# Patient Record
Sex: FEMALE | NOT HISPANIC OR LATINO | Employment: FULL TIME | URBAN - METROPOLITAN AREA
[De-identification: names, ages, dates, MRNs, and addresses within clinical notes are randomized per-mention and may not be internally consistent; named-entity substitution may affect disease eponyms.]

---
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
## 2021-05-07 ENCOUNTER — OFFICE VISIT (OUTPATIENT)
Dept: FAMILY MEDICINE | Facility: CLINIC | Age: 21
End: 2021-05-07
Payer: COMMERCIAL

## 2021-05-07 VITALS
BODY MASS INDEX: 41.5 KG/M2 | SYSTOLIC BLOOD PRESSURE: 121 MMHG | DIASTOLIC BLOOD PRESSURE: 84 MMHG | OXYGEN SATURATION: 98 % | TEMPERATURE: 98.5 F | HEART RATE: 92 BPM | RESPIRATION RATE: 16 BRPM | WEIGHT: 217 LBS

## 2021-05-07 DIAGNOSIS — R51.9 NONINTRACTABLE EPISODIC HEADACHE, UNSPECIFIED HEADACHE TYPE: ICD-10-CM

## 2021-05-07 DIAGNOSIS — H60.502 ACUTE OTITIS EXTERNA OF LEFT EAR, UNSPECIFIED TYPE: ICD-10-CM

## 2021-05-07 DIAGNOSIS — H93.8X9 SENSATION OF FULLNESS IN EAR, UNSPECIFIED LATERALITY: ICD-10-CM

## 2021-05-07 DIAGNOSIS — H66.001 ACUTE SUPPURATIVE OTITIS MEDIA OF RIGHT EAR WITHOUT SPONTANEOUS RUPTURE OF TYMPANIC MEMBRANE, RECURRENCE NOT SPECIFIED: Primary | ICD-10-CM

## 2021-05-07 DIAGNOSIS — F41.9 ANXIETY: ICD-10-CM

## 2021-05-07 PROCEDURE — 99204 OFFICE O/P NEW MOD 45 MIN: CPT | Performed by: FAMILY MEDICINE

## 2021-05-07 RX ORDER — CIPROFLOXACIN AND DEXAMETHASONE 3; 1 MG/ML; MG/ML
4 SUSPENSION/ DROPS AURICULAR (OTIC) 2 TIMES DAILY
Qty: 4 ML | Refills: 0 | Status: SHIPPED | OUTPATIENT
Start: 2021-05-07 | End: 2021-05-17

## 2021-05-07 NOTE — PATIENT INSTRUCTIONS
Ear infection  Hard to see if there is a perforation  augmentin twice a day with food 10 days   Ear drops 4 drops 2 a day for 10 days   Call if not better after that       Patient Education     Otitis Media (Middle-Ear Infection) in Adults  Otitis media is another name for a middle-ear infection. It means an infection behind your eardrum. This kind of ear infection can happen after any condition that keeps fluid from draining from the middle ear. These conditions include allergies, a cold, a sore throat, or a respiratory infection.  Middle-ear infections are common in children, but they can also happen in adults. An ear infection in an adult may mean a more serious problem than in a child. So you may need additional tests. If you have an ear infection, you should see your health care provider for treatment.  What are the types of middle-ear infections?  Infections can affect the middle ear in several ways. They are:    Acute otitis media. This middle-ear infection occurs suddenly. It causes swelling and redness. Fluid and mucus become trapped inside the ear. You can have a fever and ear pain.    Otitis media with effusion. Fluid (effusion) and mucus build up in the middle ear after the infection goes away. You may feel like your middle ear is full. This can continue for months and may affect your hearing.    Chronic otitis media with effusion. Fluid (effusion) remains in the middle ear for a long time. Or it builds up again and again, even though there is no infection. This type of middle-ear infection may be hard to treat. It may also affect your hearing.  Who is more likely to get a middle-ear infection?  You are more likely to get an ear infection if you:    Smoke or are around someone who smokes    Have seasonal or year-round allergy symptoms    Have a cold or other upper respiratory infection  What causes a middle-ear infection?  The middle ear connects to the throat by a canal called the eustachian tube. This  tube helps even out the pressure between the outer ear and the inner ear. A cold or allergy can irritate the tube or cause the area around it to swell. This can keep fluid from draining from the middle ear. The fluid builds up behind the eardrum. Bacteria and viruses can grow in this fluid. The bacteria and viruses cause the middle-ear infection.  What are the symptoms of a middle-ear infection?  Common symptoms of a middle-ear infection in adults are:    Pain in 1 or both ears    Drainage from the ear    Muffled hearing    Sore throat   You may also have a fever. Rarely, your balance can be affected.  These symptoms may be the same as for other conditions. It s important to talk with your health care provider if you think you have a middle-ear infection. If you have a high fever, severe pain behind your ear, or paralysis in your face, see your provider as soon as you can.  How is a middle-ear infection diagnosed?  Your health care provider will take a medical history and do a physical exam. He or she will look at the outer ear and eardrum with an otoscope. The otoscope is a lighted tool that lets your provider see inside the ear. A pneumatic otoscope blows a puff of air into the ear to check how well your eardrum moves. If you eardrum doesn t move well, it may mean you have fluid behind it.  Your provider may also do a test called tympanometry. This test tells how well the middle ear is working. It can find any changes in pressure in the middle ear. Your provider may test your hearing with a tuning fork.  How is a middle-ear infection treated?  A middle-ear infection may be treated with:    Antibiotics, taken by mouth or as ear drops    Medication for pain    Decongestants, antihistamines, or nasal steroids  Your health care provider may also have you try autoinsufflation. This helps adjust the air pressure in your ear. For this, you pinch your nose and gently exhale. This forces air back through the eustachian  tube.  The exact treatment for your ear infection will depend on the type of infection you have. In general, if your symptoms don t get better in 48 to 72 hours, contact your health care provider.  Middle-ear infections can cause long-term problems if not treated. They can lead to:    Infection in other parts of the head    Permanent hearing loss    Paralysis of a nerve in your face  If you have a middle-ear infection that doesn t get better, you may need to see an ear, nose, and throat specialist (otolaryngologist). You may need a CT scan or MRI to check for head and neck cancer.  Ear tubes  Sometimes fluid stays in the middle ear even after you take antibiotics and the infection goes away. In this case, your health care provider may suggest that a small tube be placed in your ear. The tube is put at the opening of the eardrum. The tube keeps fluid from building up and relieves pressure in the middle ear. It can also help you hear better. This surgery is called myringotomy. It is not often done in adults.  The tubes usually fall out on their own after 6 months to a year.    0463-5564 [employer]. [address] [place] [address]. All rights reserved. This information is not intended as a substitute for professional medical care. Always follow your healthcare professional's instructions.

## 2021-05-07 NOTE — PROGRESS NOTES
Assessment & Plan     Acute suppurative otitis media of right ear without spontaneous rupture of tympanic membrane, recurrence not specified  Ear infection OM & OE  Hard to see if there is a perforation  Augmentin twice a day with food 10 days   Ear drops 4 drops 2 a day for 10 days  To right ear  Call if not better after that   - amoxicillin-clavulanate (AUGMENTIN) 875-125 MG tablet; Take 1 tablet by mouth 2 times daily for 10 days    Acute otitis externa of left ear, unspecified type  - ciprofloxacin-dexamethasone (CIPRODEX) 0.3-0.1 % otic suspension; Place 4 drops into the right ear 2 times daily for 10 days    Sensation of fullness in ear, unspecified laterality  Due to above    Non intractable episodic headache, unspecified headache type  Related to OM & OE    Anxiety  Reassured with plan in place.     Body mass index equal to or greater than 95th percentile for age in pediatric patient  Not new, stable. New to this provider. Didn't have time to address at this acute visit. suspicion for covid is low currently.     Prescription drug management  15 minutes spent on the date of the encounter doing chart review, history and exam, documentation and further activities per the note       See Patient Instructions    Return in about 1 week (around 5/14/2021), or if symptoms worsen or fail to improve, for Follow up, in person, with any available provider.    Lisa Talley MD  [facility]    Shayy Evangelista is a 20 year old who presents for the following health issues     HPI     Acute Illness  Acute illness concerns: water in right ear sensation, headaches  Onset/Duration: 2- 3 days  Symptoms:  Fever: no  Chills/Sweats: no  Headache (location?): right side  Sinus Pressure: no  Conjunctivitis:  no  Ear Pain: no pain in right ear, just cannot hear out of it  Rhinorrhea: no  Congestion: no  Sore Throat: no  Cough: no  Wheeze: no  Decreased Appetite: no  Nausea: no  Vomiting:  no  Diarrhea: no  Dysuria/Freq.: no  Dysuria or Hematuria: no  Fatigue/Achiness: no  Sick/Strep Exposure: no  Therapies tried and outcome: None   No cutip recently   No wax  No travel  No plane  No sick contacts, lives with parents and family   No environmental allergies  Working  part time work in a kitchen    Hx of being Adopted, hx of Obesity, BMI > 95 % for age, hx of B/l breast reduction in 2017, HLD, Elevated TG, working with a dietician in the past, hx of a reported learning disability, vit d deficiency and anxiety, on vit d, seen by PCP Migel San in 2019 for a 18 yr well child check. Seen 2/21/20 for a cough and advised on home care.    Review of Systems   Constitutional, HEENT, cardiovascular, pulmonary, GI, , musculoskeletal, neuro, skin, endocrine and psych systems are negative, except as otherwise noted.      Objective    /84 (BP Location: Left arm, Patient Position: Sitting, Cuff Size: Adult Large)   Pulse 92   Temp 98.5  F (36.9  C) (Oral)   Resp 16   Wt 98.4 kg (217 lb)   LMP 04/13/2021 (Approximate)   SpO2 98%   BMI 41.50 kg/m    Body mass index is 41.5 kg/m .  Physical Exam   GENERAL: healthy, alert, no distress and obese  EYES: Eyes grossly normal to inspection, PERRL and conjunctivae and sclerae normal  HENT: normal cephalic/atraumatic, right ear: erythematous, bulging membrane, mucopurulent effusion and red and boggy canal, left ear: normal: no effusions, no erythema, normal landmarks, nose and mouth without ulcers or lesions, oropharynx clear, oral mucous membranes moist and sinuses: not tender  NECK: no adenopathy, no asymmetry, masses, or scars and thyroid normal to palpation  RESP: lungs clear to auscultation - no rales, rhonchi or wheezes  CV: regular rate and rhythm, normal S1 S2, no S3 or S4, no murmur, click or rub, no peripheral edema and peripheral pulses strong  ABDOMEN: soft, non tender, no hepatosplenomegaly, no masses and bowel sounds normal  MS: no gross  musculoskeletal defects noted, no edema  SKIN: no suspicious lesions or rashes  NEURO: Normal strength and tone, mentation intact and speech normal  PSYCH: mentation appears normal, affect normal/bright

## 2021-05-15 ENCOUNTER — NURSE TRIAGE (OUTPATIENT)
Dept: NURSING | Facility: CLINIC | Age: 21
End: 2021-05-15

## 2021-05-15 NOTE — TELEPHONE ENCOUNTER
Dad calls with questions regarding patient. Patient was seen a week ago for ear symptoms. She was diagnosed an ear infection. Patient's main symptoms was that her ear was plugged, denied pain in the ear. Patient was prescribed Augmentin and has been taking it. She reports that she is able to hear a little better but has not had a big difference in symptoms. Dad reports that the antibiotics have been causing abdominal pain and diarrhea. Patient has been tolerating it but today is wondering if she can stop the medication.    Dad is advised on home care measures at this time with follow up at the beginning of the week if there has not been much improvement with symptoms. Dad verbalizes understanding and denies further questions at this time.    Shira Canela RN  [facility] Nurse [facility]visors    Reason for Disposition    [1] MILD diarrhea AND [2] taking antibiotics    Additional Information    Negative: Shock suspected (e.g., cold/pale/clammy skin, too weak to stand, low BP, rapid pulse)    Negative: Difficult to awaken or acting confused (e.g., disoriented, slurred speech)    Negative: Sounds like a life-threatening emergency to the triager    Negative: Vomiting also present and worse than the diarrhea    Negative: [1] Blood in stool AND [2] without diarrhea    Negative: Diarrhea in a cancer patient who is currently (or recently) receiving chemotherapy or radiation therapy, or cancer patient who has metastatic or end-stage cancer and is receiving palliative care    Negative: [1] SEVERE abdominal pain (e.g., excruciating) AND [2] present > 1 hour    Negative: [1] SEVERE abdominal pain AND [2] age > 60    Negative: [1] Blood in the stool AND [2] moderate or large amount of blood    Negative: Black or tarry bowel movements  (Exception: chronic-unchanged  black-grey bowel movements AND is taking iron pills or Pepto-bismol)    Negative: [1] Drinking very little AND [2] dehydration suspected (e.g., no urine > 12 hours,  very dry mouth, very lightheaded)    Negative: Patient sounds very sick or weak to the triager    Negative: [1] SEVERE diarrhea (e.g., 7 or more times / day more than normal) AND [2] age > 60 years    Negative: [1] Constant abdominal pain AND [2] present > 2 hours    Negative: [1] Fever > 103 F (39.4 C) AND [2] not able to get the fever down using Fever Care Advice    Negative: [1] SEVERE diarrhea (e.g., 7 or more times / day more than normal) AND [2] present > 24 hours (1 day)    Negative: [1] MODERATE diarrhea (e.g., 4-6 times / day more than normal) AND [2] present > 48 hours (2 days)    Negative: [1] MODERATE diarrhea (e.g., 4-6 times / day more than normal) AND [2] age > 70 years    Negative: Fever > 101 F (38.3 C)    Negative: Fever present > 3 days (72 hours)    Negative: Abdominal pain  (Exception: Pain clears with each passage of diarrhea stool)    Negative: [1] Blood in the stool AND [2] small amount of blood   (Exception: only on toilet paper. Reason: diarrhea can cause rectal irritation with blood on wiping)    Negative: [1] Mucus or pus in stool AND [2] present > 2 days AND [3] diarrhea is more than mild    Negative: [1] Recent antibiotic therapy (i.e., within last 2 months) AND [2] diarrhea present > 3 days since antibiotic was stopped    Negative: [1] Recent hospitalization AND [2] diarrhea present > 3 days    Negative: Weak immune system (e.g., HIV positive, cancer chemo, splenectomy, organ transplant, chronic steroids)    Negative: Tube feedings (e.g., nasogastric, g-tube, j-tube)    Negative: Travel to a foreign country in past month    Negative: [1] MILD diarrhea (e.g., 1-3 or more stools than normal in past 24 hours) without known cause AND [2] present >  7 days    Negative: Diarrhea is a chronic symptom (recurrent or ongoing AND present > 4 weeks)    Negative: SEVERE diarrhea (e.g., 7 or more times / day more than normal)    Negative: MILD-MODERATE diarrhea (e.g., 1-6 times / day more than  normal)    Protocols used: DIARRHEA-A-AH    COVID 19 Nurse Triage Plan/Patient Instructions    Please be aware that novel coronavirus (COVID-19) may be circulating in the community. If you develop symptoms such as fever, cough, or SOB or if you have concerns about the presence of another infection including coronavirus (COVID-19), please contact your health care provider or visit [email or web address].     Disposition/Instructions    Home care recommended. Follow home care protocol based instructions.    Thank you for taking steps to prevent the spread of this virus.  o Limit your contact with others.  o Wear a simple mask to cover your cough.  o Wash your hands well and often.    Resources    [facility]: About COVID-19: [email or web address]    CDC: What to Do If You're Sick: www.cdc.gov/coronavirus/2019-ncov/about/steps-when-sick.html    CDC: Ending Home Isolation: www.cdc.gov/coronavirus/2019-ncov/hcp/disposition-in-home-patients.html     CDC: Caring for Someone: www.cdc.gov/coronavirus/2019-ncov/if-you-are-sick/care-for-someone.html     [facility]: Interim Guidance for Hospital Discharge to Home: www.health.[facility].[place]/diseases/coronavirus/hcp/hospdischarge.pdf    [facility] clinical trials (COVID-19 research studies): [email or web address]-clinical-trials     Below are the COVID-19 hotlines at the [facility] ([facility]). Interpreters are available.   o For health questions: Call 104-538-2311 or 1-755.976.4301 (7 a.m. to 7 p.m.)  o For questions about schools and childcare: Call [phone] or 1-463.249.8163 (7 a.m. to 7 p.m.)

## 2021-05-17 ENCOUNTER — OFFICE VISIT (OUTPATIENT)
Dept: FAMILY MEDICINE | Facility: CLINIC | Age: 21
End: 2021-05-17
Payer: COMMERCIAL

## 2021-05-17 VITALS
HEART RATE: 81 BPM | RESPIRATION RATE: 16 BRPM | BODY MASS INDEX: 40.48 KG/M2 | SYSTOLIC BLOOD PRESSURE: 120 MMHG | DIASTOLIC BLOOD PRESSURE: 78 MMHG | TEMPERATURE: 97.9 F | OXYGEN SATURATION: 97 % | HEIGHT: 62 IN | WEIGHT: 220 LBS

## 2021-05-17 DIAGNOSIS — K52.1 ANTIBIOTIC-ASSOCIATED DIARRHEA: ICD-10-CM

## 2021-05-17 DIAGNOSIS — H66.001 ACUTE SUPPURATIVE OTITIS MEDIA OF RIGHT EAR WITHOUT SPONTANEOUS RUPTURE OF TYMPANIC MEMBRANE, RECURRENCE NOT SPECIFIED: ICD-10-CM

## 2021-05-17 DIAGNOSIS — T36.95XA ANTIBIOTIC-ASSOCIATED DIARRHEA: ICD-10-CM

## 2021-05-17 DIAGNOSIS — H60.391 INFECTIVE OTITIS EXTERNA, RIGHT: ICD-10-CM

## 2021-05-17 LAB
C DIFF TOX B STL QL: NEGATIVE
SPECIMEN SOURCE: NORMAL

## 2021-05-17 PROCEDURE — 99214 OFFICE O/P EST MOD 30 MIN: CPT | Performed by: FAMILY MEDICINE

## 2021-05-17 PROCEDURE — 87493 C DIFF AMPLIFIED PROBE: CPT | Performed by: FAMILY MEDICINE

## 2021-05-17 RX ORDER — CEFDINIR 300 MG/1
300 CAPSULE ORAL 2 TIMES DAILY
Qty: 20 CAPSULE | Refills: 0 | Status: SHIPPED | OUTPATIENT
Start: 2021-05-17 | End: 2021-05-27

## 2021-05-17 RX ORDER — ONDANSETRON 4 MG/1
4 TABLET, ORALLY DISINTEGRATING ORAL EVERY 12 HOURS PRN
Qty: 20 TABLET | Refills: 0 | Status: SHIPPED | OUTPATIENT
Start: 2021-05-17 | End: 2021-06-03

## 2021-05-17 RX ORDER — OFLOXACIN 3 MG/ML
10 SOLUTION AURICULAR (OTIC) DAILY
Qty: 4 ML | Refills: 0 | Status: SHIPPED | OUTPATIENT
Start: 2021-05-17 | End: 2021-05-24

## 2021-05-17 ASSESSMENT — MIFFLIN-ST. JEOR: SCORE: 1726.16

## 2021-05-17 NOTE — PATIENT INSTRUCTIONS
Continue probiotics  For now stop imodium and ok to resume if stool test is negative  zofran (nausea medication) you can take twice daily as needed or 30 minutes before antiobitics  Cefdinir 300 mg twice daily, take with food  Follow up in ten days

## 2021-05-17 NOTE — PROGRESS NOTES
Assessment & Plan     Acute suppurative otitis media of right ear without spontaneous rupture of tympanic membrane, infective otitis externa  - BMI 40. We discussed that likely TM rupture as I am unable to see TM. Infection has not cleared and pt would need another round of abx. She is hesitant due to recent side effects. Pt did agree to another round of abx. Discussed medication side effects. Advised to use Zofran BID PRN or 30 minutes before antibiotics. Recommended continuing probiotics and stopping imodium while c.diff is pending.   - cefdinir (OMNICEF) 300 MG capsule; Take 1 capsule (300 mg) by mouth 2 times daily for 10 days  Dispense: 20 capsule; Refill: 0  - ondansetron (ZOFRAN-ODT) 4 MG ODT tab; Take 1 tablet (4 mg) by mouth every 12 hours as needed for nausea  Dispense: 20 tablet; Refill: 0  - OTOLARYNGOLOGY REFERRAL    Infective otitis externa, right  - ofloxacin (FLOXIN) 0.3 % otic solution; Place 10 drops into the right ear daily for 7 days  Dispense: 4 mL; Refill: 0    BMI 40.0-44.9, adult     Antibiotic-associated diarrhea  - Clostridium difficile Toxin B PCR; Future        Return in about 10 days (around 5/27/2021) for sympotms are not improving.    Shavonne Longoria MD  [facility]    Shayy Evangelista is a 20 year old who presents for the following health issues     HPI         Right ear still having difficulty hearing. No ear drainage, fever or chills.   Nausea has improved.   She is having 3-4 loose stools/day. She has been taking imodium once/day. No hematochezia, melena or abdominal pain.       Review of Systems         Objective    There were no vitals taken for this visit.  There is no height or weight on file to calculate BMI.  Physical Exam   GENERAL: healthy, alert and no distress  HENT: left ear canals and TM's normal, right ear canal with mucopurulent effusion and unable to see TM

## 2021-05-20 NOTE — TELEPHONE ENCOUNTER
FUTURE VISIT INFORMATION      FUTURE VISIT INFORMATION:    Date: 6/3/2021    Time: 2PM    [place] [facility] [place]FORMATION:    Referring provider:  Shavonne Longoria MD    Referring providers clinic:  [facility]     Reason for visit/diagnosis  Referred by Dr Shavonne Longoria, dx: Acute suppurative otitis media of right ear. appt per dad.    RECORDS REQUESTED FROM:       Clinic name Comments Records Status Imaging Status   [facility]  11/7/2017 note from Dr Muñoz   10/3/2017 audiogram  [facility]  5/17/2021 note and referral from Shavonne Longoria MD EPIC

## 2021-06-02 NOTE — PATIENT INSTRUCTIONS
1. You were seen in the ENT Clinic today by Dr. Arreguin.  If you have any questions or concerns after your appointment, please call   - Option 1: ENT Clinic: [phone]   - Option 2: Rachell (Dr. Arreguin's Nurse): [phone]    2.   Plan to return to clinic in 4- 6 weeks    3. Right ear culture- will call with results    4. Tobradex- right ear 2-3 drops twice daily x 2 weeks    5. Alcohol/Vinegar rinse- start after done with Tobradex- x 1 month    Alcohol/Vinegar Ear Irrigations      Items needed:  + Rubber ear or baby bulb syringe (2-3 oz size) or syringe given in clinic  +1 pint of Isopropyl  Alcohol (70%)  +Distilled (white) Vinegar    Instructions:    Remove 2 TBSP alcohol from pint bottle and discard. ADD 2 TBSP of the distilled (white) Vinegar to the pint bottle.  Fill clean cup 1/2 full of the mixture and heat to room temperature. Place large towel under the ear. Irrigate by squeezing syringe to form a steady stream into ear.     Irrigate both ears    Rachell Mathur LPN  [facility] - Otolaryngology    The patient presents with a history of chronic otitis externa.  Her right ear is cleaned today of impaction and she will be treated with Tobradex Otic drops. Therapy will be modified based upon cultures collected today. She will use acetic acid/vinegar/alcohol ear rinses after completion of the Tobradex.

## 2021-06-03 ENCOUNTER — PRE VISIT (OUTPATIENT)
Dept: OTOLARYNGOLOGY | Facility: CLINIC | Age: 21
End: 2021-06-03

## 2021-06-03 ENCOUNTER — TELEPHONE (OUTPATIENT)
Dept: OTOLARYNGOLOGY | Facility: CLINIC | Age: 21
End: 2021-06-03

## 2021-06-03 ENCOUNTER — OFFICE VISIT (OUTPATIENT)
Dept: AUDIOLOGY | Facility: CLINIC | Age: 21
End: 2021-06-03
Attending: FAMILY MEDICINE
Payer: COMMERCIAL

## 2021-06-03 ENCOUNTER — OFFICE VISIT (OUTPATIENT)
Dept: OTOLARYNGOLOGY | Facility: CLINIC | Age: 21
End: 2021-06-03
Payer: COMMERCIAL

## 2021-06-03 VITALS
HEART RATE: 87 BPM | RESPIRATION RATE: 16 BRPM | WEIGHT: 216.05 LBS | HEIGHT: 62 IN | DIASTOLIC BLOOD PRESSURE: 78 MMHG | SYSTOLIC BLOOD PRESSURE: 118 MMHG | OXYGEN SATURATION: 97 % | TEMPERATURE: 97.9 F | BODY MASS INDEX: 39.76 KG/M2

## 2021-06-03 DIAGNOSIS — H90.11 CONDUCTIVE HEARING LOSS OF RIGHT EAR WITH UNRESTRICTED HEARING OF LEFT EAR: Primary | ICD-10-CM

## 2021-06-03 DIAGNOSIS — H92.11 OTORRHEA, RIGHT: ICD-10-CM

## 2021-06-03 DIAGNOSIS — H60.8X3 OTHER NONINFECTIOUS CHRONIC OTITIS EXTERNA OF BOTH EARS: Primary | ICD-10-CM

## 2021-06-03 DIAGNOSIS — H61.21 IMPACTED CERUMEN OF RIGHT EAR: ICD-10-CM

## 2021-06-03 DIAGNOSIS — H60.501 ACUTE OTITIS EXTERNA OF RIGHT EAR, UNSPECIFIED TYPE: ICD-10-CM

## 2021-06-03 PROCEDURE — 87106 FUNGI IDENTIFICATION YEAST: CPT | Mod: 90 | Performed by: PATHOLOGY

## 2021-06-03 PROCEDURE — 92550 TYMPANOMETRY & REFLEX THRESH: CPT | Mod: 52 | Performed by: AUDIOLOGIST

## 2021-06-03 PROCEDURE — 87070 CULTURE OTHR SPECIMN AEROBIC: CPT | Mod: 90 | Performed by: PATHOLOGY

## 2021-06-03 PROCEDURE — 69210 REMOVE IMPACTED EAR WAX UNI: CPT | Performed by: OTOLARYNGOLOGY

## 2021-06-03 PROCEDURE — 99000 SPECIMEN HANDLING OFFICE-LAB: CPT | Performed by: PATHOLOGY

## 2021-06-03 PROCEDURE — 92557 COMPREHENSIVE HEARING TEST: CPT | Performed by: AUDIOLOGIST

## 2021-06-03 PROCEDURE — 99203 OFFICE O/P NEW LOW 30 MIN: CPT | Mod: 25 | Performed by: OTOLARYNGOLOGY

## 2021-06-03 PROCEDURE — 87102 FUNGUS ISOLATION CULTURE: CPT | Mod: 90 | Performed by: PATHOLOGY

## 2021-06-03 RX ORDER — TOBRAMYCIN AND DEXAMETHASONE 3; 1 MG/ML; MG/ML
2-3 SUSPENSION/ DROPS OPHTHALMIC 2 TIMES DAILY
Qty: 10 ML | Refills: 0 | OUTPATIENT
Start: 2021-06-03 | End: 2024-08-08

## 2021-06-03 RX ORDER — TOBRAMYCIN AND DEXAMETHASONE 3; 1 MG/ML; MG/ML
2-3 SUSPENSION/ DROPS OPHTHALMIC 2 TIMES DAILY
Qty: 5 ML | Refills: 0 | Status: SHIPPED | OUTPATIENT
Start: 2021-06-03 | End: 2021-06-17

## 2021-06-03 ASSESSMENT — MIFFLIN-ST. JEOR: SCORE: 1708.25

## 2021-06-03 ASSESSMENT — PAIN SCALES - GENERAL: PAINLEVEL: NO PAIN (0)

## 2021-06-03 NOTE — NURSING NOTE
"Chief Complaint   Patient presents with     Consult     otitis media right ear      Blood pressure 118/78, pulse 87, temperature 97.9  F (36.6  C), resp. rate 16, height 1.575 m (5' 2\"), weight 98 kg (216 lb 0.8 oz), SpO2 97 %.    Tony Macias LPN    "

## 2021-06-03 NOTE — PROGRESS NOTES
The patient presents with a history of chronic otitis externa in the right ear. She has been treated by Dr. Ingrid Muñoz for this condition. She reports that her left ear has not been as problematic as the right ear. She reports discomfort and fullness in the ear with drainage and decreased hearing. She reports a history of ear infections in both ears as a child. The patient denies sinusitis, rhinitis, facial pain, nasal obstruction or purulent nasal discharge.    This patient is seen in consultation at the request of Dr. Shavonne Longoria.    All other systems were reviewed and they are either negative or they are not directly pertinent to this Otolaryngology examination.      Past Medical History:    Past Medical History:   Diagnosis Date     Anxiety 2013?    only with needles     Obesity        Past Surgical History:    Past Surgical History:   Procedure Laterality Date     MAMMOPLASTY REDUCTION BILATERAL Bilateral 6/15/2017    Procedure: MAMMOPLASTY REDUCTION BILATERAL;  Bilateral Breast Reduction;  Surgeon: SLAVA Patel MD;  Location:  OR       Medications:    No current outpatient medications on file.    Allergies:    Patient has no known allergies.    Physical Examination:    The patient is a well developed, well nourished female in no apparent distress.  She is normocephalic, atraumatic with pupils equally round and reactive to light.    Oral Cavity Examination:  Normal mucosa with no masses or lesions  Nasal Examination: Normal mucosa with no masses or lesions  Ear Examination: Ear canal on the left is clear. Ear canal on the right is impacted with mildly infected cerumen impaction and debris. The ear canals are cleaned of cerumen using an alligator forceps and a suction using a binocular microscope for visualization. The tympanic membranes and middle ear spaces normal. Cultures are collected from the right ear.   Neurological Examination: Facial nerve function intact and symmetric  Integumentary  Examination: No lesions on the skin of the head and neck  Neck Examination: No masses or lesions, no lymphadenopathy  Endocrine Examination: Normal thyroid examination    Assessment and Plan:    The patient presents with a history of chronic otitis externa.  Her right ear is cleaned today of impaction and she will be treated with Tobradex Otic drops. Therapy will be modified based upon cultures collected today. She will use acetic acid/vinegar/alcohol ear rinses after completion of the Tobradex.     CC: Dr. Shavonne Longoria

## 2021-06-03 NOTE — TELEPHONE ENCOUNTER
[facility] Call Center    Phone Message    May a detailed message be left on voicemail: yes     Reason for Call: Medication Question or concern regarding medication   Prescription Clarification  Name of Medication: tobramycin-dexamethasone (TOBRADEX) 0.3-0.1 % ophthalmic suspension  Prescribing Provider: Dr Arreguin   Pharmacy:  [id number] - [place], [place] - [address]     What on the order needs clarification? Pharmacist called to alert team that the Rx may have to be re-sent. As is, there will not be enough for the Pt to complete the whole series. Pharm can be reached at [phone]. Thank you    Action Taken: Message routed to:  [facility] ENT    Travel Screening: Not Applicable

## 2021-06-03 NOTE — LETTER
6/3/2021       RE: Tonja Velez  [address]  [place] [address]     Dear Colleague,    Thank you for referring your patient, Tonja Velez, to the [facility] E[facility] CLINIC [place] at [facility]. Please see a copy of my visit note below.    The patient presents with a history of chronic otitis externa in the right ear. She has been treated by Dr. Ingrid Muñoz for this condition. She reports that her left ear has not been as problematic as the right ear. She reports discomfort and fullness in the ear with drainage and decreased hearing. She reports a history of ear infections in both ears as a child. The patient denies sinusitis, rhinitis, facial pain, nasal obstruction or purulent nasal discharge.    This patient is seen in consultation at the request of Dr. Shavonne Longoria.    All other systems were reviewed and they are either negative or they are not directly pertinent to this Otolaryngology examination.      Past Medical History:    Past Medical History:   Diagnosis Date     Anxiety 2013?    only with needles     Obesity        Past Surgical History:    Past Surgical History:   Procedure Laterality Date     MAMMOPLASTY REDUCTION BILATERAL Bilateral 6/15/2017    Procedure: MAMMOPLASTY REDUCTION BILATERAL;  Bilateral Breast Reduction;  Surgeon: SLAVA Patel MD;  Location:  OR       Medications:    No current outpatient medications on file.    Allergies:    Patient has no known allergies.    Physical Examination:    The patient is a well developed, well nourished female in no apparent distress.  She is normocephalic, atraumatic with pupils equally round and reactive to light.    Oral Cavity Examination:  Normal mucosa with no masses or lesions  Nasal Examination: Normal mucosa with no masses or lesions  Ear Examination: Ear canal on the left is clear. Ear canal on the right is impacted with mildly infected cerumen  impaction and debris. The ear canals are cleaned of cerumen using an alligator forceps and a suction using a binocular microscope for visualization. The tympanic membranes and middle ear spaces normal. Cultures are collected from the right ear.   Neurological Examination: Facial nerve function intact and symmetric  Integumentary Examination: No lesions on the skin of the head and neck  Neck Examination: No masses or lesions, no lymphadenopathy  Endocrine Examination: Normal thyroid examination    Assessment and Plan:    The patient presents with a history of chronic otitis externa.  Her right ear is cleaned today of impaction and she will be treated with Tobradex Otic drops. Therapy will be modified based upon cultures collected today. She will use acetic acid/vinegar/alcohol ear rinses after completion of the Tobradex.     CC: Dr. Shavonne Longoria      Again, thank you for allowing me to participate in the care of your patient.      Sincerely,    Chan Arreguin MD

## 2021-06-03 NOTE — PROGRESS NOTES
AUDIOLOGY REPORT    SUMMARY: Audiology visit completed. See audiogram for results.      RECOMMENDATIONS: Follow-up with ENT.    Sarah Soto, [facility]  Licensed Audiologist  [place] #[id number]

## 2021-06-06 LAB
BACTERIA SPEC CULT: ABNORMAL
SPECIMEN SOURCE: ABNORMAL

## 2021-06-25 NOTE — PATIENT INSTRUCTIONS
1. You were seen in the ENT Clinic today by Dr. Arreguin.  If you have any questions or concerns after your appointment, please call   - Option 1: ENT Clinic: [phone]   - Option 2: [place] (Dr. Arreguin's Nurse): [phone]         Candie(Dr. Arreguin's Nurse): [phone]    2.   Plan to return to clinic in 4 months    Alcohol/Vinegar Ear Irrigations      Items needed:  + Rubber ear or baby bulb syringe (2-3 oz size) or syringe given in clinic  +1 pint of Isopropyl  Alcohol (70%)  +Distilled (white) Vinegar    Instructions:    Remove 2 TBSP alcohol from pint bottle and discard. ADD 2 TBSP of the distilled (white) Vinegar to the pint bottle.  Fill clean cup 1/2 full of the mixture and heat to room temperature. Place large towel under the ear. Irrigate by squeezing syringe to form a steady stream into ear.     Irrigate ear(s) as needed      Rachell Mathur LPN  [facility] - Otolaryngology    The patient presents with a history of chronic otitis externa. She was treated with Tobradex Otic drops and she has resolved the infection. She will be treated with acetic acid/vinegar/alcohol ear rinses and she will be seen again in four months to assess her progress.

## 2021-07-01 LAB
FUNGUS SPEC CULT: ABNORMAL
FUNGUS SPEC CULT: ABNORMAL
SPECIMEN SOURCE: ABNORMAL

## 2021-07-02 ENCOUNTER — OFFICE VISIT (OUTPATIENT)
Dept: OTOLARYNGOLOGY | Facility: CLINIC | Age: 21
End: 2021-07-02
Payer: COMMERCIAL

## 2021-07-02 DIAGNOSIS — H60.63 CHRONIC OTITIS EXTERNA OF BOTH EARS, UNSPECIFIED TYPE: Primary | ICD-10-CM

## 2021-07-02 PROCEDURE — 99213 OFFICE O/P EST LOW 20 MIN: CPT | Performed by: OTOLARYNGOLOGY

## 2021-07-02 NOTE — PROGRESS NOTES
The patient presents with a history of chronic otitis externa in the right ear. She has been treated by Dr. Ingrid Muñoz for this condition. She reports that her symptoms have resolved with therapy since her last visit. She denies otorrhea, otalgia or itching in the ears.      She was treated with Tobradex Otic drops.     All other systems were reviewed and they are either negative or they are not directly pertinent to this Otolaryngology examination.      Past Medical History:    Past Medical History:   Diagnosis Date     Anxiety 2013?    only with needles     Obesity        Past Surgical History:    Past Surgical History:   Procedure Laterality Date     MAMMOPLASTY REDUCTION BILATERAL Bilateral 6/15/2017    Procedure: MAMMOPLASTY REDUCTION BILATERAL;  Bilateral Breast Reduction;  Surgeon: SLAVA Patel MD;  Location:  OR       Medications:    No current outpatient medications on file.    Allergies:    Patient has no known allergies.    Physical Examination:    The patient is a well developed, well nourished female in no apparent distress.  She is normocephalic, atraumatic with pupils equally round and reactive to light.    Oral Cavity Examination:  Normal mucosa with no masses or lesions  Nasal Examination: Normal mucosa with no masses or lesions  Ear Examination: Ear canals are clear. Tympanic membranes and middle ear spaces are normal.    Neurological Examination: Facial nerve function intact and symmetric  Integumentary Examination: No lesions on the skin of the head and neck    Assessment and Plan:    The patient presents with a history of chronic otitis externa. She was treated with Tobradex Otic drops and she has resolved the infection. She will be treated with acetic acid/vinegar/alcohol ear rinses and she will be seen again in four months to assess her progress.     CC: Dr. Shavonne Longoria

## 2021-07-02 NOTE — LETTER
7/2/2021       RE: Tonja Velez  [address]  [place] [address]     Dear Colleague,    Thank you for referring your patient, Tonja Velez, to the [facility] EAR NOSE AND THROAT CLINIC [place] at [facility]. Please see a copy of my visit note below.    The patient presents with a history of chronic otitis externa in the right ear. She has been treated by Dr. Ingrid Muñoz for this condition. She reports that her symptoms have resolved with therapy since her last visit. She denies otorrhea, otalgia or itching in the ears.      She was treated with Tobradex Otic drops.     All other systems were reviewed and they are either negative or they are not directly pertinent to this Otolaryngology examination.      Past Medical History:    Past Medical History:   Diagnosis Date     Anxiety 2013?    only with needles     Obesity        Past Surgical History:    Past Surgical History:   Procedure Laterality Date     MAMMOPLASTY REDUCTION BILATERAL Bilateral 6/15/2017    Procedure: MAMMOPLASTY REDUCTION BILATERAL;  Bilateral Breast Reduction;  Surgeon: SLAVA Patel MD;  Location:  OR       Medications:    No current outpatient medications on file.    Allergies:    Patient has no known allergies.    Physical Examination:    The patient is a well developed, well nourished female in no apparent distress.  She is normocephalic, atraumatic with pupils equally round and reactive to light.    Oral Cavity Examination:  Normal mucosa with no masses or lesions  Nasal Examination: Normal mucosa with no masses or lesions  Ear Examination: Ear canals are clear. Tympanic membranes and middle ear spaces are normal.    Neurological Examination: Facial nerve function intact and symmetric  Integumentary Examination: No lesions on the skin of the head and neck    Assessment and Plan:    The patient presents with a history of chronic otitis externa. She was  treated with Tobradex Otic drops and she has resolved the infection. She will be treated with acetic acid/vinegar/alcohol ear rinses and she will be seen again in four months to assess her progress.     CC: Dr. Shavonne Longoria      Again, thank you for allowing me to participate in the care of your patient.      Sincerely,    Chan Arreguin MD

## 2021-09-19 ENCOUNTER — HEALTH MAINTENANCE LETTER (OUTPATIENT)
Age: 21
End: 2021-09-19

## 2021-12-23 ENCOUNTER — OFFICE VISIT (OUTPATIENT)
Dept: OTOLARYNGOLOGY | Facility: CLINIC | Age: 21
End: 2021-12-23
Payer: COMMERCIAL

## 2021-12-23 VITALS
HEIGHT: 62 IN | BODY MASS INDEX: 40.48 KG/M2 | TEMPERATURE: 97.4 F | SYSTOLIC BLOOD PRESSURE: 111 MMHG | HEART RATE: 86 BPM | OXYGEN SATURATION: 97 % | DIASTOLIC BLOOD PRESSURE: 78 MMHG | WEIGHT: 220 LBS

## 2021-12-23 DIAGNOSIS — H60.63 CHRONIC OTITIS EXTERNA OF BOTH EARS, UNSPECIFIED TYPE: Primary | ICD-10-CM

## 2021-12-23 PROCEDURE — 99213 OFFICE O/P EST LOW 20 MIN: CPT | Performed by: OTOLARYNGOLOGY

## 2021-12-23 ASSESSMENT — MIFFLIN-ST. JEOR: SCORE: 1721.16

## 2021-12-23 ASSESSMENT — PAIN SCALES - GENERAL: PAINLEVEL: NO PAIN (0)

## 2021-12-23 NOTE — PATIENT INSTRUCTIONS
1. You were seen in the ENT Clinic today by Dr. Arreguin.  If you have any questions or concerns after your appointment, please call   - Option 1: ENT Clinic: [phone]   - Option 2: Rachell (Dr. Arreguin's Nurse): [phone]         Candie(Dr. Arreguin's Nurse): [phone]    2.   Plan to return to clinic in 4 months    Rachell Mathur LPN  [facility] - Otolaryngology    The patient presents with a history of chronic otitis externa. She was treated with Tobradex Otic drops and she has resolved the infection. She has done very well for the past four months. She will follow up in six months or sooner if she has any difficulty.

## 2021-12-23 NOTE — PROGRESS NOTES
The patient presents with a history of chronic otitis externa in the right ear. She has been treated by Dr. Ingrid Muñoz for this condition. She reports that her symptoms have resolved with therapy since her last visit. She denies otorrhea, otalgia or itching in the ears.  She reports having no difficulty since her last visit.       All other systems were reviewed and they are either negative or they are not directly pertinent to this Otolaryngology examination.      Past Medical History:    Past Medical History:   Diagnosis Date     Anxiety 2013?    only with needles     Obesity        Past Surgical History:    Past Surgical History:   Procedure Laterality Date     MAMMOPLASTY REDUCTION BILATERAL Bilateral 6/15/2017    Procedure: MAMMOPLASTY REDUCTION BILATERAL;  Bilateral Breast Reduction;  Surgeon: SLAVA Patel MD;  Location:  OR       Medications:    No current outpatient medications on file.    Allergies:    Patient has no known allergies.    Physical Examination:    The patient is a well developed, well nourished female in no apparent distress.  She is normocephalic, atraumatic with pupils equally round and reactive to light.    Ear Examination: Ear canals are clear. Tympanic membranes and middle ear spaces are normal.    Neurological Examination: Facial nerve function intact and symmetric  Integumentary Examination: No lesions on the skin of the head and neck    Assessment and Plan:    The patient presents with a history of chronic otitis externa. She was treated with Tobradex Otic drops and she has resolved the infection. She has done very well for the past four months. She will follow up in six months or sooner if she has any difficulty.      CC: Dr. Shavonne Longoria

## 2021-12-23 NOTE — LETTER
12/23/2021       RE: Tonja Velez  [address]  [place] [address]     Dear Colleague,    Thank you for referring your patient, Tonja Velez, to the [facility] EAR NOSE AND THROAT CLINIC [place] at [facility]. Please see a copy of my visit note below.    The patient presents with a history of chronic otitis externa in the right ear. She has been treated by Dr. Ingrid Muñoz for this condition. She reports that her symptoms have resolved with therapy since her last visit. She denies otorrhea, otalgia or itching in the ears.  She reports having no difficulty since her last visit.       All other systems were reviewed and they are either negative or they are not directly pertinent to this Otolaryngology examination.      Past Medical History:    Past Medical History:   Diagnosis Date     Anxiety 2013?    only with needles     Obesity        Past Surgical History:    Past Surgical History:   Procedure Laterality Date     MAMMOPLASTY REDUCTION BILATERAL Bilateral 6/15/2017    Procedure: MAMMOPLASTY REDUCTION BILATERAL;  Bilateral Breast Reduction;  Surgeon: SLAVA Patel MD;  Location:  OR       Medications:    No current outpatient medications on file.    Allergies:    Patient has no known allergies.    Physical Examination:    The patient is a well developed, well nourished female in no apparent distress.  She is normocephalic, atraumatic with pupils equally round and reactive to light.    Ear Examination: Ear canals are clear. Tympanic membranes and middle ear spaces are normal.    Neurological Examination: Facial nerve function intact and symmetric  Integumentary Examination: No lesions on the skin of the head and neck    Assessment and Plan:    The patient presents with a history of chronic otitis externa. She was treated with Tobradex Otic drops and she has resolved the infection. She has done very well for the past four months.  She will follow up in six months or sooner if she has any difficulty.      CC: Dr. Shavonne Longoria      Again, thank you for allowing me to participate in the care of your patient.      Sincerely,    Chan Arreguin MD

## 2021-12-23 NOTE — NURSING NOTE
"Chief Complaint   Patient presents with     RECHECK     4 month follow up      Blood pressure 111/78, pulse 86, temperature 97.4  F (36.3  C), height 1.575 m (5' 2\"), weight 99.8 kg (220 lb), SpO2 97 %.    .  Tony Macias LPN    "

## 2022-01-09 ENCOUNTER — HEALTH MAINTENANCE LETTER (OUTPATIENT)
Age: 22
End: 2022-01-09

## 2022-03-19 ENCOUNTER — ANCILLARY PROCEDURE (OUTPATIENT)
Dept: GENERAL RADIOLOGY | Facility: CLINIC | Age: 22
End: 2022-03-19
Attending: FAMILY MEDICINE
Payer: OTHER MISCELLANEOUS

## 2022-03-19 ENCOUNTER — OFFICE VISIT (OUTPATIENT)
Dept: URGENT CARE | Facility: URGENT CARE | Age: 22
End: 2022-03-19
Payer: OTHER MISCELLANEOUS

## 2022-03-19 VITALS
TEMPERATURE: 97.3 F | HEIGHT: 62 IN | OXYGEN SATURATION: 98 % | SYSTOLIC BLOOD PRESSURE: 122 MMHG | HEART RATE: 69 BPM | DIASTOLIC BLOOD PRESSURE: 76 MMHG | WEIGHT: 226 LBS | BODY MASS INDEX: 41.59 KG/M2

## 2022-03-19 DIAGNOSIS — S69.92XA HAND INJURY, LEFT, INITIAL ENCOUNTER: ICD-10-CM

## 2022-03-19 DIAGNOSIS — S69.92XA HAND INJURY, LEFT, INITIAL ENCOUNTER: Primary | ICD-10-CM

## 2022-03-19 PROCEDURE — 99214 OFFICE O/P EST MOD 30 MIN: CPT | Performed by: FAMILY MEDICINE

## 2022-03-19 PROCEDURE — 73130 X-RAY EXAM OF HAND: CPT | Mod: LT | Performed by: RADIOLOGY

## 2022-03-19 PROCEDURE — 73110 X-RAY EXAM OF WRIST: CPT | Mod: LT | Performed by: RADIOLOGY

## 2022-03-19 NOTE — PROGRESS NOTES
"Assessment & Plan     Hand injury, left, initial encounter  - XR Wrist Left G/E 3 Views  - XR Hand Left G/E 3 Views  - Wrist/Arm/Hand Supplies Order for DME - ONLY FOR DME       No fracture per my read of hand/wrist. Requesting time off from work for 1-2 days to allow pain to subside.  OTC analgesics as needed. Use ICE if swelling is present  See AVS summary for additional recommendations reviewed with patient during this visit.         Kanu Covarrubias MD   [facility]    Subjective     Larry is a 21 year old female who presents to clinic today for the following health issues:  Chief Complaint   Patient presents with     Urgent Care     Pt in clinic to have eval for left hand injury.     Hand Injury     HPI    This occurred around 11 AM ( 3 hours ago)  Caught her hand in the  at work    Her left wrist is bothering her  Denies numbness of the hand.   She is right hand dominant.   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     Only relevant medical information included as this is a possible workman's comp visit    No current outpatient medications on file.     No current facility-administered medications for this visit.         Objective    /76   Pulse 69   Temp 97.3  F (36.3  C) (Temporal)   Ht 1.575 m (5' 2\")   Wt 102.5 kg (226 lb)   LMP 02/28/2022   SpO2 98%   BMI 41.34 kg/m    Physical Exam     Left hand/wrist: pain of the radial wrist and base of the hand. No open wounds. Intact  strength    Results for orders placed or performed in visit on 03/19/22   XR Wrist Left G/E 3 Views     Status: None    Narrative    EXAM: XR HAND LT G/E 3 VW, XR WRIST LEFT G/E 3 VIEWS  LOCATION: [facility]  DATE/TIME: 3/19/2022 2:23 PM    INDICATION: Left hand and wrist pain after traumatic injury.  COMPARISON: None.      Impression    IMPRESSION: Mild soft tissue swelling over the dorsum of " the hand. No radiopaque foreign body. Normal joint alignment throughout the hand and wrist. No fracture.   Results for orders placed or performed in visit on 03/19/22   XR Hand Left G/E 3 Views     Status: None    Narrative    EXAM: XR HAND LT G/E 3 VW, XR WRIST LEFT G/E 3 VIEWS  LOCATION: [facility]  DATE/TIME: 3/19/2022 2:23 PM    INDICATION: Left hand and wrist pain after traumatic injury.  COMPARISON: None.      Impression    IMPRESSION: Mild soft tissue swelling over the dorsum of the hand. No radiopaque foreign body. Normal joint alignment throughout the hand and wrist. No fracture.           The use of [employer] dictation services may have been used to construct the content in this note; any grammatical or spelling errors are non-intentional. Please contact the author of this note directly if you are in need of any clarification.

## 2022-03-19 NOTE — PATIENT INSTRUCTIONS
Tylenol 325-650mg or ibuprofen 400mg every 4-6 hours as needed for pain.      If is a bit of swelling youcan ice the area for 10 minutes at a time every 30 to 60 minutes do this for the first 2 days.      Use the wrist brace for the first few days to help support and protect the area      If no improvement in pain the next 3 days please call your doctors office or make an appointment to see sports medicine  Call [phone] to schedule an appointment with the Sports medicine specialists

## 2022-04-22 ENCOUNTER — OFFICE VISIT (OUTPATIENT)
Dept: OTOLARYNGOLOGY | Facility: CLINIC | Age: 22
End: 2022-04-22
Payer: COMMERCIAL

## 2022-04-22 VITALS
HEART RATE: 91 BPM | HEIGHT: 62 IN | TEMPERATURE: 97.3 F | WEIGHT: 216 LBS | DIASTOLIC BLOOD PRESSURE: 74 MMHG | OXYGEN SATURATION: 98 % | BODY MASS INDEX: 39.75 KG/M2 | SYSTOLIC BLOOD PRESSURE: 116 MMHG

## 2022-04-22 DIAGNOSIS — H60.63 CHRONIC OTITIS EXTERNA OF BOTH EARS, UNSPECIFIED TYPE: Primary | ICD-10-CM

## 2022-04-22 PROCEDURE — 99212 OFFICE O/P EST SF 10 MIN: CPT | Performed by: OTOLARYNGOLOGY

## 2022-04-22 ASSESSMENT — PAIN SCALES - GENERAL: PAINLEVEL: NO PAIN (0)

## 2022-04-22 NOTE — NURSING NOTE
"Chief Complaint   Patient presents with     RECHECK     Follow up      Blood pressure 116/74, pulse 91, temperature 97.3  F (36.3  C), height 1.575 m (5' 2\"), weight 98 kg (216 lb), last menstrual period 02/28/2022, SpO2 98 %.    Tony Macias LPN    "

## 2022-04-22 NOTE — PROGRESS NOTES
The patient presents with a history of chronic otitis externa in the right ear. She has been treated by Dr. Ingrid Muñoz for this condition. She reports that her symptoms have resolved and she is not having any difficulty with her ears. She denies infection or drainage.       All other systems were reviewed and they are either negative or they are not directly pertinent to this Otolaryngology examination.      Past Medical History:    Past Medical History:   Diagnosis Date     Anxiety 2013?    only with needles     Obesity        Past Surgical History:    Past Surgical History:   Procedure Laterality Date     MAMMOPLASTY REDUCTION BILATERAL Bilateral 6/15/2017    Procedure: MAMMOPLASTY REDUCTION BILATERAL;  Bilateral Breast Reduction;  Surgeon: SLAVA Patel MD;  Location:  OR       Medications:    No current outpatient medications on file.    Allergies:    Patient has no known allergies.    Physical Examination:    The patient is a well developed, well nourished female in no apparent distress.  She is normocephalic, atraumatic with pupils equally round and reactive to light.    Ear Examination: Ear canals are clear. Tympanic membranes and middle ear spaces are normal.    Neurological Examination: Facial nerve function intact and symmetric  Integumentary Examination: No lesions on the skin of the head and neck    Assessment and Plan:    The patient presents with a history of chronic otitis externa. She was treated with Tobradex Otic drops and she has resolved the infection. She has done very well since her last.. She will follow up in six months or sooner if she has any difficulty.      CC: Dr. Shavonne Longoria

## 2022-04-22 NOTE — LETTER
4/22/2022         RE: Tonja Velez  [address]  [place] [address]         Dear Colleague,    Thank you for referring your patient, Tonja Velez, to the [facility] EAR NOSE AND THROAT CLINIC [place] at [facility]. Please see a copy of my visit note below.    The patient presents with a history of chronic otitis externa in the right ear. She has been treated by Dr. Ingrid Muñoz for this condition. She reports that her symptoms have resolved and she is not having any difficulty with her ears. She denies infection or drainage.       All other systems were reviewed and they are either negative or they are not directly pertinent to this Otolaryngology examination.      Past Medical History:    Past Medical History:   Diagnosis Date     Anxiety 2013?    only with needles     Obesity        Past Surgical History:    Past Surgical History:   Procedure Laterality Date     MAMMOPLASTY REDUCTION BILATERAL Bilateral 6/15/2017    Procedure: MAMMOPLASTY REDUCTION BILATERAL;  Bilateral Breast Reduction;  Surgeon: SLAVA Patel MD;  Location:  OR       Medications:    No current outpatient medications on file.    Allergies:    Patient has no known allergies.    Physical Examination:    The patient is a well developed, well nourished female in no apparent distress.  She is normocephalic, atraumatic with pupils equally round and reactive to light.    Ear Examination: Ear canals are clear. Tympanic membranes and middle ear spaces are normal.    Neurological Examination: Facial nerve function intact and symmetric  Integumentary Examination: No lesions on the skin of the head and neck    Assessment and Plan:    The patient presents with a history of chronic otitis externa. She was treated with Tobradex Otic drops and she has resolved the infection. She has done very well since her last.. She will follow up in six months or sooner if she has any  difficulty.            Again, thank you for allowing me to participate in the care of your patient.      Sincerely,    Chan Arreguin MD

## 2022-04-22 NOTE — PATIENT INSTRUCTIONS
1. You were seen in the ENT Clinic today by Dr. Arreguin.  If you have any questions or concerns after your appointment, please call   - Option 1: ENT Clinic: [phone]   - Option 2: Rachell (Dr. Arreguin's Nurse): [phone]         Candie(Dr. Arreguin's Nurse): [phone]    2.   Plan to return to clinic in 6 months    Rachell Mathur LPN  [facility] - Otolaryngology     The patient presents with a history of chronic otitis externa. She was treated with Tobradex Otic drops and she has resolved the infection. She has done very well since her last.. She will follow up in six months or sooner if she has any difficulty.

## 2022-11-20 ENCOUNTER — HEALTH MAINTENANCE LETTER (OUTPATIENT)
Age: 22
End: 2022-11-20

## 2022-11-25 ENCOUNTER — TELEPHONE (OUTPATIENT)
Dept: OTOLARYNGOLOGY | Facility: CLINIC | Age: 22
End: 2022-11-25

## 2022-11-25 NOTE — TELEPHONE ENCOUNTER
Spoke with patient's Father, regarding rescheduling due to provider is out of clinic. Rescheduled patient accordingly and sent new reminder letter to confirmed address. -Per Patient's Father

## 2023-01-20 ENCOUNTER — OFFICE VISIT (OUTPATIENT)
Dept: OTOLARYNGOLOGY | Facility: CLINIC | Age: 23
End: 2023-01-20
Payer: COMMERCIAL

## 2023-01-20 VITALS
BODY MASS INDEX: 42.6 KG/M2 | HEART RATE: 77 BPM | DIASTOLIC BLOOD PRESSURE: 86 MMHG | OXYGEN SATURATION: 99 % | SYSTOLIC BLOOD PRESSURE: 123 MMHG | WEIGHT: 217 LBS | TEMPERATURE: 98.6 F | HEIGHT: 60 IN

## 2023-01-20 DIAGNOSIS — H60.393 INFECTIVE OTITIS EXTERNA, BILATERAL: Primary | ICD-10-CM

## 2023-01-20 PROBLEM — E66.01 MORBID OBESITY (H): Status: ACTIVE | Noted: 2023-01-20

## 2023-01-20 PROCEDURE — 99213 OFFICE O/P EST LOW 20 MIN: CPT | Performed by: OTOLARYNGOLOGY

## 2023-01-20 ASSESSMENT — PAIN SCALES - GENERAL: PAINLEVEL: NO PAIN (0)

## 2023-01-20 NOTE — PROGRESS NOTES
The patient presents with a history of chronic otitis externa in the right ear. She has been treated by Dr. Ingrid Muñoz for this condition. She reports that her symptoms have resolved and she is not having any difficulty with her ears. She denies infection or drainage. She denies respiratory or throat infections.       All other systems were reviewed and they are either negative or they are not directly pertinent to this Otolaryngology examination.      Past Medical History:    Past Medical History:   Diagnosis Date     Anxiety 2013?    only with needles     Obesity        Past Surgical History:    Past Surgical History:   Procedure Laterality Date     MAMMOPLASTY REDUCTION BILATERAL Bilateral 6/15/2017    Procedure: MAMMOPLASTY REDUCTION BILATERAL;  Bilateral Breast Reduction;  Surgeon: SLAVA Patel MD;  Location:  OR       Medications:    No current outpatient medications on file.    Allergies:    Patient has no known allergies.    Physical Examination:    The patient is a well developed, well nourished female in no apparent distress.  She is normocephalic, atraumatic with pupils equally round and reactive to light.    Ear Examination: Ear canals are clear. Tympanic membranes and middle ear spaces are normal.    Neurological Examination: Facial nerve function intact and symmetric  Integumentary Examination: No lesions on the skin of the head and neck    Assessment and Plan:    The patient presents with a history of chronic otitis externa. She was treated with Tobradex Otic drops in the past and she has resolved the infection. She has done very well since her last visit. She will follow up in twelve months or sooner if she has any difficulty.      CC: Dr. Shavonne Longoria

## 2023-01-20 NOTE — LETTER
1/20/2023       RE: Tonja Velez  [address]  [place] [address]     Dear Colleague,    Thank you for referring your patient, Tonja Velez, to the [facility] EAR NOSE AND THROAT CLINIC [place] at [facility]. Please see a copy of my visit note below.    The patient presents with a history of chronic otitis externa in the right ear. She has been treated by Dr. Ingrid Muñoz for this condition. She reports that her symptoms have resolved and she is not having any difficulty with her ears. She denies infection or drainage. She denies respiratory or throat infections.       All other systems were reviewed and they are either negative or they are not directly pertinent to this Otolaryngology examination.      Past Medical History:    Past Medical History:   Diagnosis Date     Anxiety 2013?    only with needles     Obesity        Past Surgical History:    Past Surgical History:   Procedure Laterality Date     MAMMOPLASTY REDUCTION BILATERAL Bilateral 6/15/2017    Procedure: MAMMOPLASTY REDUCTION BILATERAL;  Bilateral Breast Reduction;  Surgeon: SLAVA Patel MD;  Location:  OR       Medications:    No current outpatient medications on file.    Allergies:    Patient has no known allergies.    Physical Examination:    The patient is a well developed, well nourished female in no apparent distress.  She is normocephalic, atraumatic with pupils equally round and reactive to light.    Ear Examination: Ear canals are clear. Tympanic membranes and middle ear spaces are normal.    Neurological Examination: Facial nerve function intact and symmetric  Integumentary Examination: No lesions on the skin of the head and neck    Assessment and Plan:    The patient presents with a history of chronic otitis externa. She was treated with Tobradex Otic drops in the past and she has resolved the infection. She has done very well since her last visit. She  will follow up in twelve months or sooner if she has any difficulty.      CC: Dr. Shavonne Longoria      Again, thank you for allowing me to participate in the care of your patient.      Sincerely,    Chan Arreguin MD

## 2023-01-20 NOTE — PATIENT INSTRUCTIONS
1. You were seen in the ENT Clinic today by Dr. Arreguin.  If you have any questions or concerns after your appointment, please call   - Option 1: ENT Clinic: [phone]   - Option 2: Rachell (Dr. Arreguin's Nurse): [phone]       Tracey (Dr. Arreguin's Nurse): [phone]    2.   Plan to return to clinic in 1 year    How to Contact Us:  Send a [employer] message to your provider. Our team will respond to you via [employer]. Occasionally, we will need to call you to get further information.  For urgent matters (Monday-Friday), call the ENT Clinic: [phone] and speak with a call center team member - they will route your call appropriately.   If you'd like to speak directly with a nurse, please find our contact information below. We do our best to check voicemail frequently throughout the day, and will work to call you back within 1-2 days. For urgent matters, please use the general clinic phone numbers listed above.    The patient presents with a history of chronic otitis externa. She was treated with Tobradex Otic drops in the past and she has resolved the infection. She has done very well since her last visit. She will follow up in twelve months or sooner if she has any difficulty.      Rachell BURRELL LPN  [facility] - Otolaryngology

## 2023-04-15 ENCOUNTER — HEALTH MAINTENANCE LETTER (OUTPATIENT)
Age: 23
End: 2023-04-15

## 2024-06-22 ENCOUNTER — HEALTH MAINTENANCE LETTER (OUTPATIENT)
Age: 24
End: 2024-06-22

## 2025-04-25 NOTE — PATIENT INSTRUCTIONS
Arcelia sent to patient to convey below per NP.    Take ear drops for 7 days, then return for for another hearing screen and ear exam.

## 2025-07-12 ENCOUNTER — HEALTH MAINTENANCE LETTER (OUTPATIENT)
Age: 25
End: 2025-07-12

## (undated) DEVICE — PAD CHUX UNDERPAD 30X30"

## (undated) DEVICE — NDL 25GA 2"  8881200441

## (undated) DEVICE — LINEN TOWEL PACK X5 5464

## (undated) DEVICE — SU DERMABOND PRINEO CLR602US

## (undated) DEVICE — ESU GROUND PAD ADULT W/CORD E7507

## (undated) DEVICE — SU PLAIN FAST ABSORB 5-0 PC-1 18" 1915G

## (undated) DEVICE — SOL NACL 0.9% IRRIG 1000ML BOTTLE 2F7124

## (undated) DEVICE — STPL SKIN 35W 059037

## (undated) DEVICE — ESU ELEC BLADE HEX-LOCKING 2.5" E1450X

## (undated) DEVICE — SUCTION SLEEVE NEPTUNE 2 125MM 0703-005-125

## (undated) DEVICE — ESU PENCIL SMOKE EVAC W/ROCKER SWITCH 0703-047-000

## (undated) DEVICE — BLADE KNIFE SURG 10 371110

## (undated) DEVICE — SU STRATAFIX 2-0 MH 36" SXPD2B412

## (undated) DEVICE — DRAPE IOBAN INCISE 23X17" 6650EZ

## (undated) DEVICE — DRAIN JACKSON PRATT CHANNEL 15FR ROUND HUBLESS SIL JP-2228

## (undated) DEVICE — GLOVE PROTEXIS POWDER FREE SMT 7.0  2D72PT70X

## (undated) DEVICE — SYR 10ML FINGER CONTROL W/O NDL 309695

## (undated) DEVICE — PACK MINOR CUSTOM ASC

## (undated) DEVICE — PAD ARMBOARD FOAM EGGCRATE 50676-378

## (undated) DEVICE — BASIN SET MINOR DISP

## (undated) DEVICE — SUCTION TIP YANKAUER W/O VENT K86

## (undated) DEVICE — PREP CHLORAPREP 26ML TINTED ORANGE  260815

## (undated) DEVICE — DRAPE U SPLIT 74X120" 29440

## (undated) DEVICE — DRSG KERLIX 4 1/2"X4YDS ROLL 6730

## (undated) DEVICE — SOL WATER IRRIG 500ML BOTTLE 2F7113

## (undated) DEVICE — LINEN TOWEL PACK X6 WHITE 5487

## (undated) DEVICE — SYR BULB IRRIG 50ML LATEX FREE 0035280

## (undated) DEVICE — DRAIN JACKSON PRATT RESERVOIR 100ML SU130-1305

## (undated) DEVICE — SU MONOCRYL 2-0 SH 27" UND Y417H

## (undated) DEVICE — DRSG ABDOMINAL 07 1/2X8" 7197D

## (undated) DEVICE — SU DERMABOND PRINEO 22CM CLR222US

## (undated) DEVICE — SUCTION MANIFOLD NEPTUNE 2 SYS 1 PORT 702-025-000

## (undated) DEVICE — DRSG DRAIN 2X2" 7087

## (undated) DEVICE — SPONGE LAP 18X18" X8435

## (undated) RX ORDER — PROPOFOL 10 MG/ML
INJECTION, EMULSION INTRAVENOUS
Status: DISPENSED
Start: 2017-06-15

## (undated) RX ORDER — KETOROLAC TROMETHAMINE 30 MG/ML
INJECTION, SOLUTION INTRAMUSCULAR; INTRAVENOUS
Status: DISPENSED
Start: 2017-06-15

## (undated) RX ORDER — DEXAMETHASONE SODIUM PHOSPHATE 4 MG/ML
INJECTION, SOLUTION INTRA-ARTICULAR; INTRALESIONAL; INTRAMUSCULAR; INTRAVENOUS; SOFT TISSUE
Status: DISPENSED
Start: 2017-06-15

## (undated) RX ORDER — FENTANYL CITRATE 50 UG/ML
INJECTION, SOLUTION INTRAMUSCULAR; INTRAVENOUS
Status: DISPENSED
Start: 2017-06-15

## (undated) RX ORDER — BUPIVACAINE HYDROCHLORIDE AND EPINEPHRINE 2.5; 5 MG/ML; UG/ML
INJECTION, SOLUTION EPIDURAL; INFILTRATION; INTRACAUDAL; PERINEURAL
Status: DISPENSED
Start: 2017-06-15

## (undated) RX ORDER — GABAPENTIN 300 MG/1
CAPSULE ORAL
Status: DISPENSED
Start: 2017-06-15

## (undated) RX ORDER — ACETAMINOPHEN 325 MG/1
TABLET ORAL
Status: DISPENSED
Start: 2017-06-15

## (undated) RX ORDER — ONDANSETRON 2 MG/ML
INJECTION INTRAMUSCULAR; INTRAVENOUS
Status: DISPENSED
Start: 2017-06-15